# Patient Record
Sex: MALE | Race: OTHER | HISPANIC OR LATINO | URBAN - METROPOLITAN AREA
[De-identification: names, ages, dates, MRNs, and addresses within clinical notes are randomized per-mention and may not be internally consistent; named-entity substitution may affect disease eponyms.]

---

## 2020-11-05 ENCOUNTER — EMERGENCY (EMERGENCY)
Facility: HOSPITAL | Age: 20
LOS: 1 days | Discharge: HOME | End: 2020-11-05
Attending: EMERGENCY MEDICINE
Payer: SELF-PAY

## 2020-11-05 VITALS
OXYGEN SATURATION: 99 % | SYSTOLIC BLOOD PRESSURE: 133 MMHG | HEART RATE: 82 BPM | DIASTOLIC BLOOD PRESSURE: 95 MMHG | TEMPERATURE: 98 F

## 2020-11-05 LAB
ALBUMIN SERPL ELPH-MCNC: 4.4 G/DL — SIGNIFICANT CHANGE UP (ref 3.3–5.2)
ALP SERPL-CCNC: 93 U/L — SIGNIFICANT CHANGE UP (ref 40–120)
ALT FLD-CCNC: 19 U/L — SIGNIFICANT CHANGE UP
ANION GAP SERPL CALC-SCNC: 14 MMOL/L — SIGNIFICANT CHANGE UP (ref 5–17)
APTT BLD: 29.7 SEC — SIGNIFICANT CHANGE UP (ref 27.5–35.5)
AST SERPL-CCNC: 20 U/L — SIGNIFICANT CHANGE UP
BASE EXCESS BLDV CALC-SCNC: -2.9 MMOL/L — LOW (ref -2–2)
BASOPHILS # BLD AUTO: 0.09 K/UL — SIGNIFICANT CHANGE UP (ref 0–0.2)
BASOPHILS NFR BLD AUTO: 0.8 % — SIGNIFICANT CHANGE UP (ref 0–2)
BILIRUB SERPL-MCNC: <0.2 MG/DL — LOW (ref 0.4–2)
BUN SERPL-MCNC: 14 MG/DL — SIGNIFICANT CHANGE UP (ref 8–20)
CALCIUM SERPL-MCNC: 8.9 MG/DL — SIGNIFICANT CHANGE UP (ref 8.6–10.2)
CHLORIDE SERPL-SCNC: 104 MMOL/L — SIGNIFICANT CHANGE UP (ref 98–107)
CO2 SERPL-SCNC: 22 MMOL/L — SIGNIFICANT CHANGE UP (ref 22–29)
CREAT SERPL-MCNC: 0.82 MG/DL — SIGNIFICANT CHANGE UP (ref 0.5–1.3)
EOSINOPHIL # BLD AUTO: 0.38 K/UL — SIGNIFICANT CHANGE UP (ref 0–0.5)
EOSINOPHIL NFR BLD AUTO: 3.5 % — SIGNIFICANT CHANGE UP (ref 0–6)
ETHANOL SERPL-MCNC: 298 MG/DL — HIGH (ref 0–9)
GAS PNL BLDV: SIGNIFICANT CHANGE UP
GLUCOSE SERPL-MCNC: 77 MG/DL — SIGNIFICANT CHANGE UP (ref 70–99)
HCO3 BLDV-SCNC: 21 MMOL/L — SIGNIFICANT CHANGE UP (ref 21–29)
HCT VFR BLD CALC: 47.5 % — SIGNIFICANT CHANGE UP (ref 39–50)
HGB BLD-MCNC: 15.3 G/DL — SIGNIFICANT CHANGE UP (ref 13–17)
IMM GRANULOCYTES NFR BLD AUTO: 0.5 % — SIGNIFICANT CHANGE UP (ref 0–1.5)
INR BLD: 0.99 RATIO — SIGNIFICANT CHANGE UP (ref 0.88–1.16)
LIDOCAIN IGE QN: 11 U/L — LOW (ref 22–51)
LYMPHOCYTES # BLD AUTO: 4.39 K/UL — HIGH (ref 1–3.3)
LYMPHOCYTES # BLD AUTO: 40.3 % — SIGNIFICANT CHANGE UP (ref 13–44)
MCHC RBC-ENTMCNC: 29.1 PG — SIGNIFICANT CHANGE UP (ref 27–34)
MCHC RBC-ENTMCNC: 32.2 GM/DL — SIGNIFICANT CHANGE UP (ref 32–36)
MCV RBC AUTO: 90.5 FL — SIGNIFICANT CHANGE UP (ref 80–100)
MONOCYTES # BLD AUTO: 0.85 K/UL — SIGNIFICANT CHANGE UP (ref 0–0.9)
MONOCYTES NFR BLD AUTO: 7.8 % — SIGNIFICANT CHANGE UP (ref 2–14)
NEUTROPHILS # BLD AUTO: 5.14 K/UL — SIGNIFICANT CHANGE UP (ref 1.8–7.4)
NEUTROPHILS NFR BLD AUTO: 47.1 % — SIGNIFICANT CHANGE UP (ref 43–77)
PCO2 BLDV: 42 MMHG — SIGNIFICANT CHANGE UP (ref 35–50)
PH BLDV: 7.35 — SIGNIFICANT CHANGE UP (ref 7.32–7.43)
PLATELET # BLD AUTO: 298 K/UL — SIGNIFICANT CHANGE UP (ref 150–400)
PO2 BLDV: 37 MMHG — SIGNIFICANT CHANGE UP (ref 25–45)
POTASSIUM SERPL-MCNC: 3.9 MMOL/L — SIGNIFICANT CHANGE UP (ref 3.5–5.3)
POTASSIUM SERPL-SCNC: 3.9 MMOL/L — SIGNIFICANT CHANGE UP (ref 3.5–5.3)
PROT SERPL-MCNC: 7.8 G/DL — SIGNIFICANT CHANGE UP (ref 6.6–8.7)
PROTHROM AB SERPL-ACNC: 11.5 SEC — SIGNIFICANT CHANGE UP (ref 10.6–13.6)
RBC # BLD: 5.25 M/UL — SIGNIFICANT CHANGE UP (ref 4.2–5.8)
RBC # FLD: 13.4 % — SIGNIFICANT CHANGE UP (ref 10.3–14.5)
SAO2 % BLDV: 66 % — SIGNIFICANT CHANGE UP
SODIUM SERPL-SCNC: 140 MMOL/L — SIGNIFICANT CHANGE UP (ref 135–145)
WBC # BLD: 10.9 K/UL — HIGH (ref 3.8–10.5)
WBC # FLD AUTO: 10.9 K/UL — HIGH (ref 3.8–10.5)

## 2020-11-05 PROCEDURE — 72170 X-RAY EXAM OF PELVIS: CPT | Mod: 26

## 2020-11-05 PROCEDURE — 99053 MED SERV 10PM-8AM 24 HR FAC: CPT

## 2020-11-05 PROCEDURE — 72125 CT NECK SPINE W/O DYE: CPT | Mod: 26

## 2020-11-05 PROCEDURE — 99291 CRITICAL CARE FIRST HOUR: CPT

## 2020-11-05 PROCEDURE — 71045 X-RAY EXAM CHEST 1 VIEW: CPT | Mod: 26

## 2020-11-05 PROCEDURE — 70450 CT HEAD/BRAIN W/O DYE: CPT | Mod: 26

## 2020-11-05 RX ORDER — ONDANSETRON 8 MG/1
4 TABLET, FILM COATED ORAL ONCE
Refills: 0 | Status: COMPLETED | OUTPATIENT
Start: 2020-11-05 | End: 2020-11-05

## 2020-11-05 RX ORDER — TETANUS TOXOID, REDUCED DIPHTHERIA TOXOID AND ACELLULAR PERTUSSIS VACCINE, ADSORBED 5; 2.5; 8; 8; 2.5 [IU]/.5ML; [IU]/.5ML; UG/.5ML; UG/.5ML; UG/.5ML
0.5 SUSPENSION INTRAMUSCULAR ONCE
Refills: 0 | Status: COMPLETED | OUTPATIENT
Start: 2020-11-05 | End: 2020-11-05

## 2020-11-05 RX ADMIN — TETANUS TOXOID, REDUCED DIPHTHERIA TOXOID AND ACELLULAR PERTUSSIS VACCINE, ADSORBED 0.5 MILLILITER(S): 5; 2.5; 8; 8; 2.5 SUSPENSION INTRAMUSCULAR at 23:45

## 2020-11-05 RX ADMIN — ONDANSETRON 4 MILLIGRAM(S): 8 TABLET, FILM COATED ORAL at 23:17
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
## 2020-11-05 NOTE — ED PROVIDER NOTE - PATIENT PORTAL LINK FT
You can access the FollowMyHealth Patient Portal offered by [facility] by registering at the following website: [email or web address]. By joining [employer]’s FollowMyHealth portal, you will also be able to view your health information using other applications (apps) compatible with our system.

## 2020-11-05 NOTE — H&P ADULT - HISTORY OF PRESENT ILLNESS
Patient is 25 yo M who was BIBEMS and was found down. Unable to obtain any further history due to patient's alcoholic intoxication.    A airway intact, C collar placed, speaking  B equal breath sounds bilaterally  C radial/DP/femoral pulses intact bilaterally   D GCS14 [id number], moving all fours, no focal deficits, pupils R 3mm reactive/L 3mm reactive  E patient fully exposed, no gross deformities or bleeding, provided warm blankets    CXR no fracture or hemopneumothorax    Initial vitals:     Secondary survey remarkable for 4cm laceration to occipital scalp    ROS: 10-system review is otherwise negative except HPI above.      PAST MEDICAL & SURGICAL HISTORY: unable to obtain    FAMILY HISTORY: unable to obtain    [] Family history not pertinent as reviewed with the patient and family    SOCIAL HISTORY:  unable to obtain    ALLERGIES: No Known Allergies      HOME MEDICATIONS: unknown    --------------------------------------------------------------------------------------------    PHYSICAL EXAM:   General: NAD, Lying in bed comfortably  Neuro: A+Ox2 (alcoholic intoxication)  HEENT: NC/AT, EOMI, 4cm laceration to posterior scalp  Neck: C-collar in place  Cardio: RRR, nml S1/S2  Resp: Good effort, CTA b/l  Thorax: No chest wall tenderness  GI/Abd: Soft, NT/ND, no rebound/guarding, no masses palpated  Vascular: All 4 extremities warm.  Skin: Intact, no breakdown  Lymphatic/Nodes: No palpable lymphadenopathy  Pelvis: stable  Musculoskeletal: All 4 extremities moving spontaneously, no limitations, no spinal tenderness or stepoffs        IMAGING  CT head:  CT C-spine:  CXR:     ASSESSMENT: Patient is a 24y old male who was found down and reported that he was drinking alcohol.    PLAN:    - C collar    - strict bedrest/OOB/ambulate  - strict I/Os  - Patient seen/examined with attending.  - Plan to be discussed with Attending,
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
## 2020-11-05 NOTE — ED PROVIDER NOTE - NSFOLLOWUPINSTRUCTIONS_ED_ALL_ED_FT
Return to the ER in 10 days for staple removal (11/16/2020).  Keep the wound clean and dry for at least 24-48 hours.   After that, you may gently cleanse with regular soap and water but do not vigorously scrub or remove the staples.   Return to the ER for any redness, warmth, swelling, pain, discharge or any other new symptoms.

## 2020-11-05 NOTE — ED ADULT NURSE REASSESSMENT NOTE - NS ED NURSE REASSESS COMMENT FT1
pt received from critical care RNs. pt A&Ox3 in NAD. cooperative at this time. yellow gown remains in place. pt with episode of vomiting in CT as per previous RN. pt medicated as ordered. no vomiting since assumption of care. c collar in place.

## 2020-11-06 VITALS
RESPIRATION RATE: 18 BRPM | SYSTOLIC BLOOD PRESSURE: 130 MMHG | HEART RATE: 96 BPM | TEMPERATURE: 98 F | OXYGEN SATURATION: 98 % | DIASTOLIC BLOOD PRESSURE: 73 MMHG

## 2020-11-06 PROCEDURE — 71045 X-RAY EXAM CHEST 1 VIEW: CPT

## 2020-11-06 PROCEDURE — 85730 THROMBOPLASTIN TIME PARTIAL: CPT

## 2020-11-06 PROCEDURE — 36415 COLL VENOUS BLD VENIPUNCTURE: CPT

## 2020-11-06 PROCEDURE — 83690 ASSAY OF LIPASE: CPT

## 2020-11-06 PROCEDURE — 72170 X-RAY EXAM OF PELVIS: CPT

## 2020-11-06 PROCEDURE — 85610 PROTHROMBIN TIME: CPT

## 2020-11-06 PROCEDURE — 90715 TDAP VACCINE 7 YRS/> IM: CPT

## 2020-11-06 PROCEDURE — 70450 CT HEAD/BRAIN W/O DYE: CPT

## 2020-11-06 PROCEDURE — 90471 IMMUNIZATION ADMIN: CPT

## 2020-11-06 PROCEDURE — 83605 ASSAY OF LACTIC ACID: CPT

## 2020-11-06 PROCEDURE — 82803 BLOOD GASES ANY COMBINATION: CPT

## 2020-11-06 PROCEDURE — 72125 CT NECK SPINE W/O DYE: CPT

## 2020-11-06 PROCEDURE — 80307 DRUG TEST PRSMV CHEM ANLYZR: CPT

## 2020-11-06 PROCEDURE — 96374 THER/PROPH/DIAG INJ IV PUSH: CPT

## 2020-11-06 PROCEDURE — T1013: CPT

## 2020-11-06 PROCEDURE — 99291 CRITICAL CARE FIRST HOUR: CPT | Mod: 25

## 2020-11-06 PROCEDURE — 85025 COMPLETE CBC W/AUTO DIFF WBC: CPT

## 2020-11-06 PROCEDURE — 80053 COMPREHEN METABOLIC PANEL: CPT

## 2020-11-06 RX ORDER — SODIUM CHLORIDE 9 MG/ML
1000 INJECTION, SOLUTION INTRAVENOUS ONCE
Refills: 0 | Status: COMPLETED | OUTPATIENT
Start: 2020-11-06 | End: 2020-11-06

## 2020-11-06 RX ADMIN — SODIUM CHLORIDE 1000 MILLILITER(S): 9 INJECTION, SOLUTION INTRAVENOUS at 06:10

## 2020-11-06 NOTE — ED ADULT NURSE REASSESSMENT NOTE - NS ED NURSE REASSESS COMMENT FT1
pt awake, alert, answering appropriately. ambulates with steady gait. belongings returned. d/c instructions provided by MD with .

## 2020-11-19 ENCOUNTER — EMERGENCY (EMERGENCY)
Facility: HOSPITAL | Age: 20
LOS: 1 days | Discharge: HOME | End: 2020-11-19
Attending: EMERGENCY MEDICINE
Payer: SELF-PAY

## 2020-11-19 VITALS
HEART RATE: 86 BPM | WEIGHT: 179.9 LBS | OXYGEN SATURATION: 96 % | RESPIRATION RATE: 18 BRPM | TEMPERATURE: 99 F | SYSTOLIC BLOOD PRESSURE: 127 MMHG | HEIGHT: 68 IN | DIASTOLIC BLOOD PRESSURE: 75 MMHG

## 2020-11-19 PROCEDURE — T1013: CPT

## 2020-11-19 PROCEDURE — G0463: CPT

## 2020-11-19 PROCEDURE — L9995: CPT

## 2020-11-19 NOTE — ED PROVIDER NOTE - PATIENT PORTAL LINK FT
You can access the FollowMyHealth Patient Portal offered by [facility] by registering at the following website: [email or web address]. By joining [employer]’s FollowMyHealth portal, you will also be able to view your health information using other applications (apps) compatible with our system.

## 2020-11-19 NOTE — ED PROVIDER NOTE - CLINICAL SUMMARY MEDICAL DECISION MAKING FREE TEXT BOX
21 y/o M with no PMHx presents to ED for suture removal from scalp laceration occurring 13 days ago  -Wound well healed, will remove staples, provide wound care, advise f/u PMD  -Discussed results, plan and return precautions with patient, pt verbalized understanding and agreement of plan  ED  Anjelica Marcus

## 2020-11-19 NOTE — ED PROCEDURE NOTE - CPROC ED INFORMED CONSENT1
ED  Anjelica Marcus/Benefits, risks, and possible complications of procedure explained to patient/caregiver who verbalized understanding and gave verbal consent.

## 2020-11-19 NOTE — ED PROVIDER NOTE - OBJECTIVE STATEMENT
19 y/o M with no PMHx presents to ED for suture removal from scalp laceration occurring 13 days ago. Pt has been cleaning wound as instructed at home. Denies fever/chills, increased pain to site, swelling, erythema, red streaking, warmth at site, discharge or pus from wound, numbness/tingling, weakness, CP, SOB.  Pt had TDAP updated in ED.

## 2020-11-19 NOTE — ED PROVIDER NOTE - PHYSICAL EXAMINATION
Vital signs noted, see flowsheet.  General: Well nourished/developed. In no acute distress, well appearing and non-toxic.  HEENT: Moist mucous membranes.   Neck: No midline c-spine ttp  Cardiac: Regular rate and rhythm. +S1/S2. Peripheral pulses 2+ and symmetric b/l.  Respiratory: Speaking in full sentences, no evidence of respiratory distress. Lungs clear to ascultation b/l  Skin: 3 cm well healed laceration to left posterior scalp with 4 staples in place, no wound dehiscence, no surrounding erythema/red streaking, no discharge, no increased warmth. Normal color for race, no evidence of rash, ecchymosis, cyanosis or jaundice.   Neuro: Awake, alert and oriented to person/place/time/situation. Moves all extremities spontaneously and symmetrically.

## 2020-11-19 NOTE — ED PROVIDER NOTE - NSFOLLOWUPINSTRUCTIONS_ED_ALL_ED_FT
- Please follow up with your Primary Care Doctor in 24-48 hr.  - Seek immediate medical care for any new, worsening or concerning signs or symptoms.   Feel better!   Laceration    A laceration is a cut that goes through all of the layers of the skin and into the tissue that is right under the skin. Some lacerations heal on their own. Others need to be closed with skin adhesive strips, skin glue, stitches (sutures), or staples. Proper laceration care minimizes the risk of infection and helps the laceration to heal better.  If non-absorbable stitches or staples have been placed, they must be taken out within the time frame instructed by your healthcare provider.    SEEK IMMEDIATE MEDICAL CARE IF YOU HAVE ANY OF THE FOLLOWING SYMPTOMS: swelling around the wound, worsening pain, drainage from the wound, red streaking going away from your wound, inability to move finger or toe near the laceration, or discoloration of skin near the laceration.     Cuidado de las grapas quirúrgicas    LO QUE NECESITA SABER:    Las grapas se utilizan a menudo para cerrar nisreen herida. Las grapas podrían colocarse por 3 a 14 días, dependiendo de la ubicación de la herida.    INSTRUCCIONES SOBRE EL ZEUS HOSPITALARIA:    Cuidado de ritchie herida:  •Limpieza:?Usted podría ducharse en 24 horas. No sumerja ritchie herida bajo el agua.      ?Lávese la herida con mucho cuidado con agua tibia y jabón a diario. Luego séquela suavemente. No cubra la herida, a menos que así lo indique ritchie médico.      ?Es probable que usted también necesite limpiarse la herida con nisreen mezcla de peróxido de hidrógeno y agua. Pregúntele al médico cómo preparar la mezcla.      ?No aplique ningún ungüento o crema en la herida, a menos que así se lo indique ritchie médico.      •Eleve:?Descanse el brazo o pierna que tiene la herida sobre almohadas de manera que quede elevado por encima del nivel de ritchie corazón Erinn esto tan a menudo neli sea posible sheela 2 días. [place] le ayudará a disminuir la inflamación y el dolor, además de ayudarle a sanar más rápido.          •Minimice las cicatrices:?Evite que la rosalina del sol le pegue en la herida para así evitar que la cicatriz empeore.            Acuda a kristie consultas de control con ritchie médico según le indicaron.Es probable que usted necesite ir donde ritchie médico para que le revise la herida 3 días después de que le hayan colocado las grapas. Pregúntele a ritchie médico cuando debe regresar a que le extraigan las grapas.    Extracción de grapas:  •Se utilizará un extractor de grapas quirúrgicasse usará para extraer las grapas. Ritchie médico colocará el instrumento por debajo de cada grapa, apretará la manija y luego con mucho cuidado procederá a sacar la grapa.      •Cinta médicase colocará en la herida nisreen vez que se quitan las grapas. [place] le ayudará a mantener cerrada la herida. La cinta adhesiva médica se caerá por sí phoebe después de varios días.      Comuníquese con ritchie médico si:  •Usted tiene enrojecimiento, dolor, inflamación o pus drenando de la herida.      •Kristie medicamentos para el dolor no le alivian el dolor.      •Tiene fiebre de 101 ºF (38.5 °C) o más.      •Le sale olor de la herida.      •Usted tiene preguntas o inquietudes acerca de ritchie condición o cuidado.      Regrese a la grace de emergencias si:  •Ritchie herida se vuelve a abrir.      •Usted tiene unas ritu saunders en la piel que salen de ritchie herida.      •Usted tiene dolor o vómito severos.

## 2020-11-19 NOTE — ED PROVIDER NOTE - NSFOLLOWUPCLINICS_GEN_ALL_ED_FT
[id number] [place] [address]  Phone: [phone]  Fax:   Follow Up Time: 1-3 Days

## 2020-11-19 NOTE — ED ADULT TRIAGE NOTE - DIRECT TO ROOM CARE INITIATED:
Pt calling back to check on Dexcom forms.    [facility] and gave her the # to call them to follow up 19-Nov-2020 16:59

## 2020-11-26 ENCOUNTER — EMERGENCY (EMERGENCY)
Facility: HOSPITAL | Age: 20
LOS: 1 days | Discharge: HOME | End: 2020-11-26
Attending: STUDENT IN AN ORGANIZED HEALTH CARE EDUCATION/TRAINING PROGRAM
Payer: SELF-PAY

## 2020-11-26 VITALS
OXYGEN SATURATION: 98 % | RESPIRATION RATE: 17 BRPM | HEART RATE: 88 BPM | SYSTOLIC BLOOD PRESSURE: 124 MMHG | WEIGHT: 190.04 LBS | HEIGHT: 68 IN | DIASTOLIC BLOOD PRESSURE: 73 MMHG | TEMPERATURE: 98 F

## 2020-11-26 PROCEDURE — 99284 EMERGENCY DEPT VISIT MOD MDM: CPT

## 2020-11-26 PROCEDURE — 99053 MED SERV 10PM-8AM 24 HR FAC: CPT

## 2020-11-26 NOTE — ED ADULT NURSE NOTE - OBJECTIVE STATEMENT
Pt is awake and alert.  +ETOH.  RR even and unlabored. Skin is warm and dry.  Abrasion and hematoma noted to left side of forehead. No active bleeding.  Pt reports he was drinking a lot today and fell.  Denies drug use.  Dressed in yellow gown, bed in lowest position.  Safety is maintained.

## 2020-11-26 NOTE — ED ADULT NURSE NOTE - NSIMPLEMENTINTERV_GEN_ALL_ED
Implemented All Fall Risk Interventions:  [place] to call system. Call bell, personal items and telephone within reach. Instruct patient to call for assistance. Room bathroom lighting operational. Non-slip footwear when patient is off stretcher. Physically safe environment: no spills, clutter or unnecessary equipment. Stretcher in lowest position, wheels locked, appropriate side rails in place. Provide visual cue, wrist band, yellow gown, etc. Monitor gait and stability. Monitor for mental status changes and reorient to person, place, and time. Review medications for side effects contributing to fall risk. Reinforce activity limits and safety measures with patient and family.

## 2020-11-26 NOTE — ED PROVIDER NOTE - NSFOLLOWUPINSTRUCTIONS_ED_ALL_ED_FT
Trastorno por consumo de bebidas alcohólicas  Alcohol Use Disorder    El trastorno por consumo de bebidas alcohólicas ocurre cuando el consumo de alcohol altera ritchie marjorie cotidiana. Las personas que padecen esta afección beben demasiado alcohol y no pueden controlar el consumo.    Marisol trastorno puede causar problemas graves en la robert física. Puede afectar el cerebro, el corazón, el hígado, el páncreas, el sistema inmunitario, el estómago y los intestinos. El trastorno por consumo de bebidas alcohólicas puede aumentar ritchie riesgo de contraer ciertos tipos de cáncer y causar problemas con la robert mental, tales neil depresión, ansiedad, psicosis, delirios y demencia. Las personas que tienen marisol trastorno corren el riesgo de lastimarse a ellas mismas o a otras personas.    ¿Cuáles son las causas?  Esta afección se debe al consumo excesivo de alcohol con el transcurso del tiempo. No es causado por consumir demasiado alcohol solo nisreen o dos veces. Algunas personas que sufren esta afección beben alcohol para enfrentarse a situaciones negativas de la marjorie o escapar de ellas. Otros beben para aliviar el dolor o los síntomas de nisreen enfermedad mental.    ¿Qué incrementa el riesgo?  Es más probable que desarrolle esta afección si:    Tiene antecedentes familiares de trastorno por consumo de bebidas alcohólicas.  Ritchie cultura alienta el consumo de alcohol al punto de la intoxicación o facilita el acceso al alcohol.  Tuvo un trastorno emocional o de conducta en la infancia.  Fue víctima de abuso.  Es adolescente y:  Tiene calificaciones bajas o dificultades en la escuela.  Kristie cuidadores no le hablan sobre negarse a aman alcohol ni supervisan kristie actividades.  Es impulsivo o tiene problemas con el autocontrol.    ¿Cuáles son los signos o los síntomas?  Los síntomas de esta afección incluyen los siguientes:    Beber más de lo que desea.  Beber por más tiempo del que desea.  Intentar varias veces beber menos o controlar el consumo de alcohol.  Invertir mucho tiempo en conseguir alcohol, beber o recuperarse de jesse bebido.  Sentir nisreen necesidad imperiosa de beber alcohol.  Tener problemas en el trabajo, la escuela o el hogar debido al consumo de alcohol.  Tener problemas en las relaciones debido al consumo de alcohol.  Beber cuando es peligroso hacerlo, por ejemplo, antes de conducir.  Continuar bebiendo incluso sabiendo que podría tener un problema físico o mental relacionado con el consumo de alcohol.  Necesitar cada vez más alcohol para obtener el mismo efecto que desea de marisol (generar tolerancia).  Tener síntomas de abstinencia al dejar de beber. Entre los síntomas de abstinencia se incluyen los siguientes:  Fatiga.  Pesadillas.  Dificultad para dormir.  Depresión.  Ansiedad.  Fiebre.  Convulsiones.  Confusión grave.  Umu o sentir cosas que no existen (alucinaciones).  Temblores.  Frecuencia cardíaca acelerada.  Respiración rápida.  Hipertensión arterial.  Consumir para evitar los síntomas de abstinencia.    ¿Cómo se diagnostica?  Esta afección se diagnostica con nisreen evaluación. El médico puede comenzar la evaluación con karin o cuatro preguntas sobre ritchie consumo de alcohol.    El médico podrá realizar un examen físico o análisis de laboratorio para determinar si tiene problemas físicos neil resultado del consumo de alcohol. Puede derivarlo a un profesional de robert mental para que realice nisreen evaluación.    ¿Cómo se trata?  Algunas personas con trastorno por consumo de bebidas alcohólicas pueden reducir el consumo a niveles de bajo riesgo. Otras necesitan dejar el alcohol por completo. Cuando sea necesario, puede recibir ayuda de profesionales de robert mental capacitados en el tratamiento del abuso de sustancias. El médico puede ayudarlo a determinar la gravedad de ritchie trastorno por consumo de bebidas alcohólicas y el tipo de tratamiento que necesita. Las formas de tratamiento disponibles son:    Desintoxicación. La desintoxicación implica dejar de beber y aman medicamentos recetados en el plazo de la primera semana para reducir los síntomas de la abstinencia. Marisol tratamiento es importante para las personas que ya peña tenido síntomas de abstinencia y para los que consumen en exceso, quienes probablemente sufran síntomas de abstinencia. La abstinencia puede ser peligrosa y, en casos graves, puede causar la muerte. La desintoxicación puede realizarse en el hogar, en un ámbito extrahospitalario, en un centro de atención primaria, en un hospital o en un centro de tratamiento para abuso de sustancias.  Asesoramiento psicológico. Marisol tratamiento también se conoce neil psicoterapia. La realizan terapeutas especializados en el tratamiento de pacientes que abusan de sustancias. Un terapeuta puede abordar los motivos por los que consume alcohol y sugerirle maneras de evitar que vuelva a beber o que tenga un problema con la bebida. Los objetivos de la psicoterapia son los siguientes:  Encontrar actividades saludables y formas de afrontar el estrés.  Identificar y evitar aquello que lo conduzca a beber alcohol.  Aprender a controlar las ansias de beber.  Medicamentos. Los medicamentos pueden ayudar a tratar el trastorno por consumo de bebidas alcohólicas porque:  Controlan las ansias de beber.  Reducen el sentimiento positivo que experimenta al beber alcohol.  Causan nisreen reacción física desagradable cuando noemí alcohol (terapia de aversión).  Grupos de apoyo. Los grupos de apoyo son dirigidos por personas que peña superado ritchie problema con la bebida. [place] apoyo emocional, consejos y orientación.    Estas formas de tratamiento, generalmente, se combinan. Algunas personas con esta afección se benefician del tratamiento combinado que se ofrece en algunos centros de tratamiento especializados en el abuso de sustancias.     Siga estas indicaciones en ritchie casa:  [place] los medicamentos de venta jameel y los recetados solamente neil se lo haya indicado el médico.  Consulte al médico antes de empezar cualquier medicamento nuevo.  Pida a familiares y amigos que no le ofrezcan alcohol.  Evite situaciones en las que se sirva alcohol, incluidas las reuniones en las que otros estén bebiendo alcohol.  [place] un plan de acción si siente la tentación de beber alcohol.  Busque pasatiempos o actividades que disfrute, dominick que no incluyan el consumo de alcohol.  Concurra a todas las visitas de control neil se lo haya indicado el médico. [place] es importante.    ¿Cómo se emeka?  Si noemí, limite el consumo de alcohol a no más de 1 medida por día si es elvira y no está embarazada, y a 2 medidas si es hombre. Nisreen medida equivale a 12 oz (355 ml) de cerveza, 5 oz (148 ml) de vino o 1½ oz (44 ml) de bebidas alcohólicas de michelle graduación.  Si tiene nisreen afección de robert mental, debe buscar tratamiento y apoyo.  No ofrezca alcohol a adolescentes.  Si es adolescente:  No iris alcohol.  No tenga miedo de negarse si alguien le ofrece alcohol. Diga en voz michelle por qué no quiere beber alcohol. Puede ser un modelo a seguir positivo para kristie amigos y un buen ejemplo para las personas que lo rodean al no consumir alcohol.  Si kristie amigos beben alcohol, pase más tiempo con quienes no lo kwasi. Erinn nuevas amistades que no consuman alcohol.  Busque maneras saludables de controlar el estrés y las emociones, linda neil meditar, respirar profundo, hacer actividad física, pasar tiempo en la naturaleza, escuchar música o hablar con un amigo o un familiar confiable.    Comuníquese con un médico si:  No puede aman los medicamentos neil se lo peña indicado.  Los síntomas empeoran.  Vuelve a consumir alcohol (recaída) y kristie síntomas empeoran.    Solicite ayuda de inmediato si:  Tiene pensamientos acerca de lastimarse a usted mismo o a otras personas.    Si alguna vez siente que puede lastimarse a usted mismo o a los demás, o piensa en poner fin a ritchie marjorie, busque ayuda de inmediato. Puede dirigirse al servicio de urgencias más cercano o comunicarse con:    Ritchie servicio de emergencias local (911 en los [place]).  Nisreen línea de asistencia al suicida y atención en crisis, neil la Línea Nacional de Prevención del Suicidio (National Suicide Prevention Lifeline) al [phone]. Está disponible las 24 horas del día.    Resumen  El trastorno por consumo de bebidas alcohólicas ocurre cuando el consumo de alcohol altera ritchie marjorie cotidiana. Las personas que padecen esta afección beben demasiado alcohol y no pueden controlar el consumo.  El tratamiento puede incluir desintoxicación, asesoramiento psicológico, medicamentos y grupos de apoyo.  Pida a familiares y amigos que no le ofrezcan alcohol. Evite situaciones en las que se sirva alcohol.  Busque ayuda de inmediato si tiene pensamientos acerca de lastimarse a usted mismo o a otras personas.    Pare de aman tanto alcol.  Por favor tenga seguimiento con ritchie doctor primario entre 2 luna.  Regrese a urgencias por cualquier preocupacion medica.

## 2020-11-26 NOTE — ED PROVIDER NOTE - NS ED ROS FT
Review of Systems:  	•	CONSTITUTIONAL - no fever, no diaphoresis, no weight change  	•	SKIN - no rash  	•	HEMATOLOGIC - no bleeding, no bruising  	•	EYES - no eye pain, no blurred vision  	•	ENT - no change in hearing, no pain  	•	RESPIRATORY - no shortness of breath, no cough  	•	CARDIAC - no chest pain, no palpitations  	•	GI - no abd pain, no nausea, no vomiting, no diarrhea, no constipation, no bleeding  	•	GENITO-URINARY - no discharge, no dysuria; no hematuria  	•	ENDO - no polydypsia, no polyurea, no heat/no cold intolerance  	•	MUSCULOSKELETAL - no joint pain, no swelling, no redness  	•	NEUROLOGIC - no weakness, no headache, no anesthesia, no paresthesias  	•	PSYCH - no anxiety, non suicidal, non homicidal, no hallucination, no depression  	•	ALLERGY - no rhinitis

## 2020-11-26 NOTE — ED PROVIDER NOTE - OBJECTIVE STATEMENT
21yo male with pmh of alcohol abuse presents intox. Pt states he drank too much. Pt may have fallen doesn't recall. Pt denies fevers/chills, focal neuro deficits, cp/sob/palp, cough, abd pain/n/v/d, urinary symptoms, recent travel.

## 2020-11-26 NOTE — ED PROVIDER NOTE - PHYSICAL EXAMINATION
General: no signs of trauma, somnolent, in no apparent distress.  Head: AT, NC  HEENT: Airway patent., no saniz sign, no raccoon eyes, no hemotympanum   Eyes: clear bilaterally, pupils equal, round and reactive to light.  Cardiac: Normal rate, regular rhythm.  Heart sounds S1, S2.    Respiratory: Breath sounds clear and equal bilaterally.  Abdomen soft, non-tender, no guarding.  MSK: moving all extremities x 4  Neuro: somnolent, follows commands, ataxia, slurred speech  Skin: normal color.
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
## 2020-11-27 VITALS
RESPIRATION RATE: 20 BRPM | HEART RATE: 107 BPM | DIASTOLIC BLOOD PRESSURE: 62 MMHG | SYSTOLIC BLOOD PRESSURE: 100 MMHG | TEMPERATURE: 98 F | OXYGEN SATURATION: 95 %

## 2020-11-27 PROCEDURE — 70450 CT HEAD/BRAIN W/O DYE: CPT | Mod: 26

## 2020-11-28 PROCEDURE — T1013: CPT

## 2020-11-28 PROCEDURE — 70450 CT HEAD/BRAIN W/O DYE: CPT

## 2020-11-28 PROCEDURE — 82962 GLUCOSE BLOOD TEST: CPT

## 2020-11-28 PROCEDURE — 99285 EMERGENCY DEPT VISIT HI MDM: CPT | Mod: 25

## 2021-08-23 ENCOUNTER — EMERGENCY (EMERGENCY)
Facility: HOSPITAL | Age: 21
LOS: 1 days | Discharge: HOME | End: 2021-08-23
Attending: EMERGENCY MEDICINE
Payer: SELF-PAY

## 2021-08-23 VITALS
SYSTOLIC BLOOD PRESSURE: 122 MMHG | RESPIRATION RATE: 18 BRPM | OXYGEN SATURATION: 96 % | HEIGHT: 68 IN | HEART RATE: 102 BPM | DIASTOLIC BLOOD PRESSURE: 77 MMHG | TEMPERATURE: 98 F

## 2021-08-23 VITALS — HEART RATE: 94 BPM

## 2021-08-23 PROBLEM — F10.10 ALCOHOL ABUSE, UNCOMPLICATED: Chronic | Status: ACTIVE | Noted: 2020-11-27

## 2021-08-23 PROCEDURE — 71120 X-RAY EXAM BREASTBONE 2/>VWS: CPT

## 2021-08-23 PROCEDURE — 71045 X-RAY EXAM CHEST 1 VIEW: CPT

## 2021-08-23 PROCEDURE — 73130 X-RAY EXAM OF HAND: CPT | Mod: 26,RT

## 2021-08-23 PROCEDURE — 71120 X-RAY EXAM BREASTBONE 2/>VWS: CPT | Mod: 26

## 2021-08-23 PROCEDURE — 71045 X-RAY EXAM CHEST 1 VIEW: CPT | Mod: 26

## 2021-08-23 PROCEDURE — 73110 X-RAY EXAM OF WRIST: CPT

## 2021-08-23 PROCEDURE — 99053 MED SERV 10PM-8AM 24 HR FAC: CPT

## 2021-08-23 PROCEDURE — 99284 EMERGENCY DEPT VISIT MOD MDM: CPT | Mod: 25

## 2021-08-23 PROCEDURE — 99284 EMERGENCY DEPT VISIT MOD MDM: CPT

## 2021-08-23 PROCEDURE — 73130 X-RAY EXAM OF HAND: CPT

## 2021-08-23 PROCEDURE — 73110 X-RAY EXAM OF WRIST: CPT | Mod: 26,RT

## 2021-08-23 PROCEDURE — T1013: CPT

## 2021-08-23 RX ORDER — ACETAMINOPHEN 500 MG
650 TABLET ORAL ONCE
Refills: 0 | Status: COMPLETED | OUTPATIENT
Start: 2021-08-23 | End: 2021-08-23

## 2021-08-23 RX ADMIN — Medication 650 MILLIGRAM(S): at 03:02
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
## 2021-08-23 NOTE — ED PROVIDER NOTE - PATIENT PORTAL LINK FT
You can access the FollowMyHealth Patient Portal offered by [facility] by registering at the following website: [email or web address]. By joining [employer]s FollowMyHealth portal, you will also be able to view your health information using other applications (apps) compatible with our system.
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
## 2021-08-23 NOTE — ED PROVIDER NOTE - PHYSICAL EXAMINATION
General: In NAD.  Head: NC/AT.   Eyes: No raccoon eyes. PERRLA, EOMI, no nystagmus.  Ears: No sainz signs or hemotympanum b/l.  Mouth: No dental injuries.  Neck: No abrasions or ecchymosis. Supple, no midline/paraspinal tenderness to palpation. No bony step offs. FROM.  Cardiac: Rate and rhythm regular. No audible murmur, gallop, or rub.   Chest/Lungs: No deformity, ecchymosis, abrasions. Negative seatbelt sign. Normal AP to lateral diameter. Symmetrical excursion b/l. No chest wall tenderness. Breath sounds vesicular, symmetrical and without rales, rhonchi or wheezing b/l.   PV: Radial, DP, PT pulses 2+. Capillary refill <2 seconds.  Abdomen: No scars, lesions, ecchymosis, or visible pulsations. Soft, non-tender, non-distended, no masses palpated. No bruits. No hepatosplenomegaly to palpitation. No CVA tenderness.  Extremities: Atraumatic. No deformity. No snuff box tenderness. Pelvis stable. FROM.  Neuro: GCS 15. A&Ox3. Clear speech, steady gait, cerebellar intact, no focal deficits. Motor intact. Sensation intact to b/l upper and lower extremities. General: In NAD.  Head: NC/AT.   Eyes: No raccoon eyes. PERRLA, EOMI, no nystagmus.  Ears: No sainz signs or hemotympanum b/l.  Mouth: No dental injuries.  Neck: No abrasions or ecchymosis. Supple, no midline/paraspinal tenderness to palpation. No bony step offs. FROM.  Cardiac: Rate and rhythm regular. No audible murmur, gallop, or rub.   Chest/Lungs: No deformity, ecchymosis, abrasions. Negative seatbelt sign. Normal AP to lateral diameter. Symmetrical excursion b/l. +Reproducible chest wall tenderness. Breath sounds vesicular, symmetrical and without rales, rhonchi or wheezing b/l.   PV: Radial, DP, PT pulses 2+. Capillary refill <2 seconds.  Abdomen: No scars, lesions, ecchymosis, or visible pulsations. Soft, non-tender, non-distended, no masses palpated. No bruits. No CVA tenderness.  Extremities: Atraumatic. No deformity. No snuff box tenderness. Pelvis stable. FROM.  Neuro: GCS 15. A&Ox3. Clear speech, steady gait, cerebellar intact, no focal deficits. Motor intact. Sensation intact to b/l upper and lower extremities. General: In NAD.  Head: NC/AT.   Eyes: No raccoon eyes. PERRLA, EOMI, no nystagmus.  Ears: No sainz signs or hemotympanum b/l.  Mouth: No dental injuries.  Neck: No abrasions or ecchymosis. Supple, no midline/paraspinal tenderness to palpation. No bony step offs. FROM.  Cardiac: Rate and rhythm regular. No audible murmur, gallop, or rub.   Chest/Lungs: No deformity, ecchymosis, abrasions. Negative seatbelt sign. Normal AP to lateral diameter. Symmetrical excursion b/l. +Reproducible chest wall tenderness. No rib pain. Breath sounds vesicular, symmetrical and without rales, rhonchi or wheezing b/l.   PV: Radial, DP, PT pulses 2+. Capillary refill <2 seconds.  Abdomen: No scars, lesions, ecchymosis, or visible pulsations. Soft, non-tender, non-distended, no masses palpated. No bruits. No CVA tenderness.  Extremities: Atraumatic. No deformity. No snuff box tenderness. Pelvis stable. FROM.  Neuro: GCS 15. A&Ox3. Clear speech, steady gait, cerebellar intact, no focal deficits. Motor intact. Sensation intact to b/l upper and lower extremities.
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
## 2021-08-23 NOTE — ED PROVIDER NOTE - NSFOLLOWUPINSTRUCTIONS_ED_ALL_ED_FT
- Ibuprofen/acetaminophen for pain.  - Please bring all documentation from your ED visit to any related future follow up appointment.  - Please call to schedule follow up appointment with your primary care physician within 24-48 hours.  - Please seek immediate medical attention for any new/worsening, signs/symptoms, or concerns.    Feel better!    - Ibuprofeno / acetaminofén para el dolor.  - Traiga toda la documentación de ritchie visita al servicio de urgencias a cualquier du de seguimiento futura relacionada.  - Llame para programar nisreen du de seguimiento con ritchie médico de atención primaria dentro de las 24 a 48 horas.  - Busque atención médica inmediata ante cualquier nuevo / empeoramiento, signos / síntomas o inquietudes.    ¡Sentirse mejor!       Accidente automovilístico    LO QUE NECESITA SABER:    Los accidentes automovilísticos pueden causar lesiones ocasionadas por el impacto o por jesse sido movido de un lado al otro dentro del eva. Podría tener un hematoma en el abdomen, pecho o susan debido al cinturón de seguridad. También puede que tenga dolor en ritchie sayra, susan o espalda. Podría sentir dolor en las rodillas, caderas o muslos si ritchie cuerpo golpea el tablero o el volante. El dolor muscular tiende a empeorar de 1 a 2 días después del accidente.    INSTRUCCIONES SOBRE EL ZEUS HOSPITALARIA:    Llame al número de emergencias local (911 en los [place]) si:  •Usted tiene un nuevo dolor de pecho o éste empeora, o tiene falta de aliento.          Llame a ritchie médico si:  •Usted tiene un dolor nuevo o peor en el abdomen.      •Usted tiene náuseas y vómitos que no mejoran.      •Usted tiene un gunjan dolor de khadra.      •Usted tiene debilidad, hormigueo o adormecimiento en ron brazos o piernas.      •Usted tiene un dolor nuevo o peor que le dificulta el movimiento.      •Usted tiene dolor que aparece de 2 a 3 días después del accidente.      •Usted tiene preguntas o inquietudes acerca de ritchie condición o cuidado.      Medicamentos:  •Analgésicos:Usted podría recibir medicamento para quitarle o reducir el dolor. No espere a que el dolor sea muy intenso para aman el medicamento.      •Los LALO,neil el ibuprofeno, ayudan a disminuir la inflamación, el dolor y la fiebre. Marisol medicamento está disponible con o sin nisreen receta médica. Los LALO pueden causar sangrado estomacal o problemas renales en ciertas personas. Si usted esta tomando un anticoágulante,  siempre pregunte si los AINEs son seguros para usted. Siempre donato la etiqueta de marisol medicamento y siga las instrucciones. No administre marisol medicamento a niños menores de 6 meses de marjorie sin antes obtener la autorización de ritchie médico.      •[place] ron medicamentos neil se le haya indicado.Consulte con ritchie médico si usted kye que ritchie medicamento no le está ayudando o si presenta efectos secundarios. Infórmele si es alérgico a algún medicamento. Mantenga nisreen lista actualizada de los medicamentos, las vitaminas y los productos herbales que tawnya. Incluya los siguientes datos de los medicamentos: cantidad, frecuencia y motivo de administración. Traiga con usted la lista o los envases de las píldoras a ron citas de seguimiento. Lleve la lista de los medicamentos con usted en graham de nisreen emergencia.      Cuidados personales:  •Use hielo y calor.El hielo ayuda a disminuir la inflamación y el dolor. El hielo también puede contribuir a evitar el daño de los tejidos. Use nisreen compresa de hielo o ponga hielo triturado en nisreen bolsa de plástico. Cúbrala con nisreen toalla y aplíquela al área adolorida por 15 a 20 minutos cada hora o neil se le indique. Después de 2 días, use nisreen compresa caliente en el área lesionada. Aplique calor neil se lo recomiende el médico.      •Estire ron músculos cuidadosamente.Grant ejercicios suaves para estirar ron músculos después de jesse sufrido un accidente automovilístico. Consulte con ritchie médico sobre cuáles ejercicios hacer.      Consejos de seguridad:Lo siguiente puede ayudar a prevenir otro accidente automovilístico o a reducir el riesgo de lesiones:   •Use siempre ritchie cinturón de seguridad.El uso de ritchie cinturón de seguridad ayudará a reducir las lesiones sufridas por accidentes automovilísticos. El cinturón de seguridad debe tener nisreen harden que atraviese ritchie pecho y otra que atraviese ritchie regazo.      •Siempre coloque a ritchie hijo en un asiento de seguridad para niños.Use un asiento de seguridad hecho para ritchie edad, altura y peso. Elija un asiento de seguridad que tenga un arnés y un broche. Coloque el asiento de seguridad en la plaza del medio del asiento trasero del automóvil. El asiento de seguridad no debería moverse en ninguna dirección más de 1 pulgada después de ajustarlo. Siga siempre las instrucciones proporcionadas para ritchie asiento de seguridad para ayudarle a colocarlo. Las instrucciones también le indicarán cómo sujetar a ritchie malcom en el asiento correctamente. Pregúntele a ritchie médico sobre más información acerca de los asientos de seguridad para niños.   Asiento de seguridad para niños en automóviles           •Disminuya la velocidad.Maneje ritchie eva al límite de velocidad para reducir ritchie riesgo de accidentes automovilísticos.      •No maneje si se siente cansado.Usted reacciona más lentamente cuando está cansado. El tiempo de reacción lento aumentará el riesgo de un accidente automovilístico.      •No hable por teléfono ni envíe mensajes de texto mientras maneje.Usted no reaccionará lo suficientemente rápido en nisreen emergencia si se distrae con mensajes de texto o conversaciones.      •No consuma drogas ni alcohol antes de manejar.Es probable que se sienta más cansado o tome riesgos que usualmente no tomaría. No maneje después de aman medicamentos que le dan sueño. Use un conductor designado o grant arreglos para que lo lleven a ritchie casa.      •Ayude a ritchie hijo adolescente a convertirse en un conductor seguro.Sea un buen modelo al manejar. Hable con ritchie hijo adolescente sobre las maneras de reducir el riesgo de un accidente automovilístico. Estas incluyen no conducir cuando está cansado y no tener distracciones, neil un teléfono. Recuérdele a ritchie hijo adolescente que siempre debe ir al límite de velocidad y usar el cinturón de seguridad.      Acuda a ron consultas de control con ritchie médico según le indicaron.Anote ron preguntas para que se acuerde de hacerlas sheela ron visitas.

## 2022-04-05 NOTE — ED ADULT NURSE NOTE - NSFALLRSKHARMRISK_ED_ALL_ED
Patient is here for IVIG  Vitals are stable  IVIG infusing  Patient resting with no issues  Call bell within reach 
Patient tolerated infusion without complications  Port flushed and good blood return noted  Patient aware of next appointment   Patient declined AVS 
no

## 2023-01-20 NOTE — ED PROVIDER NOTE - NSTIMEPROVIDERCAREINITIATE_GEN_ER
MATTHEW BELTRAN did receive a consult from [address] [place] [address] in person  [facility] informed MATTHEW BELTRAN that Pt wants to apply for Medicare Part B Premium  MATTHEW BELTRAN and [address] [place] [address] both agreed to schedule time to discuss about Pt current state  [address] [place] [address] asked MATTHEW BELRTAN to please place a referral for Pt in order to assist him with applying for the service above  MATTHEW BELTRAN did place a referral to [address] [place] [address], informed her that the referral has been placed and assigned Pt to [facility] as well  MATTHEW BELTRAN is remain available for further assistance as needed  MATTHEW BELTRAN will continue to follow up  23-Aug-2021 02:13

## 2024-11-26 NOTE — ED ADULT TRIAGE NOTE - LANGUAGE ASSISTANCE NEEDED
called and spoke w/pt.  Pt's appt has been sched with Dr. Magdaleno on Friday, 12/20/2024 at 9:00AM at [facility].   Yes-Patient/Caregiver accepts free interpretation services...